# Patient Record
Sex: FEMALE | Race: OTHER | HISPANIC OR LATINO | URBAN - METROPOLITAN AREA
[De-identification: names, ages, dates, MRNs, and addresses within clinical notes are randomized per-mention and may not be internally consistent; named-entity substitution may affect disease eponyms.]

---

## 2017-09-28 ENCOUNTER — EMERGENCY (EMERGENCY)
Facility: HOSPITAL | Age: 47
LOS: 0 days | Discharge: HOME | End: 2017-09-29

## 2017-09-28 DIAGNOSIS — R35.0 FREQUENCY OF MICTURITION: ICD-10-CM

## 2017-09-28 DIAGNOSIS — N30.91 CYSTITIS, UNSPECIFIED WITH HEMATURIA: ICD-10-CM

## 2019-12-27 ENCOUNTER — EMERGENCY (EMERGENCY)
Facility: HOSPITAL | Age: 49
LOS: 0 days | Discharge: HOME | End: 2019-12-27
Admitting: EMERGENCY MEDICINE
Payer: MEDICAID

## 2019-12-27 VITALS
DIASTOLIC BLOOD PRESSURE: 69 MMHG | RESPIRATION RATE: 18 BRPM | SYSTOLIC BLOOD PRESSURE: 133 MMHG | HEART RATE: 62 BPM | OXYGEN SATURATION: 99 %

## 2019-12-27 DIAGNOSIS — R51 HEADACHE: ICD-10-CM

## 2019-12-27 DIAGNOSIS — Z79.899 OTHER LONG TERM (CURRENT) DRUG THERAPY: ICD-10-CM

## 2019-12-27 DIAGNOSIS — J34.89 OTHER SPECIFIED DISORDERS OF NOSE AND NASAL SINUSES: ICD-10-CM

## 2019-12-27 DIAGNOSIS — B00.1 HERPESVIRAL VESICULAR DERMATITIS: ICD-10-CM

## 2019-12-27 PROCEDURE — 99283 EMERGENCY DEPT VISIT LOW MDM: CPT

## 2019-12-27 RX ORDER — ACYCLOVIR 50 MG/G
1 OINTMENT TOPICAL
Qty: 30 | Refills: 0
Start: 2019-12-27 | End: 2019-12-31

## 2019-12-27 NOTE — ED PROVIDER NOTE - PATIENT PORTAL LINK FT
You can access the FollowMyHealth Patient Portal offered by [facility] by registering at the following website: [email or web address]. By joining [facility]’s FollowMyHealth portal, you will also be able to view your health information using other applications (apps) compatible with our system.

## 2019-12-27 NOTE — ED PROVIDER NOTE - PHYSICAL EXAMINATION
CONSTITUTIONAL: Well-appearing; well-nourished; in no apparent distress.   EYES: PERRL; EOM intact.   ENT: normal nose; no rhinorrhea; normal pharynx with no tonsillar hypertrophy.   NECK: Supple; non-tender; no cervical lymphadenopathy.   CARDIOVASCULAR: Normal S1, S2; no murmurs, rubs, or gallops.   RESPIRATORY: Normal chest excursion with respiration; breath sounds clear and equal bilaterally; no wheezes, rhonchi, or rales.  GI/: Normal bowel sounds; non-distended; non-tender; no palpable organomegaly.   MS: No evidence of trauma or deformity. Normal ROM in all four extremities; non-tender to palpation; distal pulses are normal.   SKIN: + vesicles surrounding lips with crusting noted. No discharge/ Mucous membrane involvement  NEURO/PSYCH: A & O x 4; grossly unremarkable. mood and manner are appropriate. Grooming and personal hygiene are appropriate. No apparent thoughts of harm to self or others.

## 2019-12-27 NOTE — ED ADULT NURSE NOTE - NSIMPLEMENTINTERV_GEN_ALL_ED
Implemented All Universal Safety Interventions:  [place] to call system. Call bell, personal items and telephone within reach. Instruct patient to call for assistance. Room bathroom lighting operational. Non-slip footwear when patient is off stretcher. Physically safe environment: no spills, clutter or unnecessary equipment. Stretcher in lowest position, wheels locked, appropriate side rails in place.

## 2019-12-27 NOTE — ED PROVIDER NOTE - OBJECTIVE STATEMENT
49 year old F no pmhx c/o vesicles surrounding lips x 1 week. Also notes uri symptoms, nasal congestion, sore throat, fatigue and mild headache. No fever, chills, body aches, ear pain, cough, chest pain, sob.     HX obtained via  #[id number]

## 2019-12-27 NOTE — ED PROVIDER NOTE - NS ED ROS FT
Constitutional: no fever, chills, no recent weight loss, change in appetite or malaise  Eyes: no redness/discharge/pain/vision changes  ENT: + rhinorrhea, sore throat  Cardiac: No chest pain, SOB or edema.  Respiratory: No cough or respiratory distress  GI: No nausea, vomiting, diarrhea or abdominal pain.  MS: no pain to back or extremities, no loss of ROM, no weakness  Neuro: No headache or weakness. No LOC.  Skin: + vesicles surrounding lips  Endocrine: No history of thyroid disease or diabetes.  Except as documented in the HPI, all other systems are negative.

## 2021-07-24 ENCOUNTER — EMERGENCY (EMERGENCY)
Facility: HOSPITAL | Age: 51
LOS: 0 days | Discharge: HOME | End: 2021-07-24
Attending: EMERGENCY MEDICINE | Admitting: EMERGENCY MEDICINE
Payer: MEDICAID

## 2021-07-24 VITALS
TEMPERATURE: 98 F | SYSTOLIC BLOOD PRESSURE: 128 MMHG | OXYGEN SATURATION: 97 % | DIASTOLIC BLOOD PRESSURE: 61 MMHG | HEART RATE: 61 BPM | RESPIRATION RATE: 18 BRPM

## 2021-07-24 VITALS
SYSTOLIC BLOOD PRESSURE: 115 MMHG | RESPIRATION RATE: 18 BRPM | TEMPERATURE: 97 F | OXYGEN SATURATION: 100 % | HEART RATE: 50 BPM | DIASTOLIC BLOOD PRESSURE: 67 MMHG

## 2021-07-24 DIAGNOSIS — K08.89 OTHER SPECIFIED DISORDERS OF TEETH AND SUPPORTING STRUCTURES: ICD-10-CM

## 2021-07-24 DIAGNOSIS — Z23 ENCOUNTER FOR IMMUNIZATION: ICD-10-CM

## 2021-07-24 PROCEDURE — 99283 EMERGENCY DEPT VISIT LOW MDM: CPT

## 2021-07-24 RX ORDER — BNT162B2 0.23 MG/2.25ML
0.3 INJECTION, SUSPENSION INTRAMUSCULAR ONCE
Refills: 0 | Status: COMPLETED | OUTPATIENT
Start: 2021-07-24 | End: 2021-07-24

## 2021-07-24 RX ORDER — IBUPROFEN 200 MG
600 TABLET ORAL ONCE
Refills: 0 | Status: COMPLETED | OUTPATIENT
Start: 2021-07-24 | End: 2021-07-24

## 2021-07-24 RX ORDER — AMOXICILLIN 250 MG/5ML
500 SUSPENSION, RECONSTITUTED, ORAL (ML) ORAL ONCE
Refills: 0 | Status: COMPLETED | OUTPATIENT
Start: 2021-07-24 | End: 2021-07-24

## 2021-07-24 RX ORDER — AMOXICILLIN 250 MG/5ML
1 SUSPENSION, RECONSTITUTED, ORAL (ML) ORAL
Qty: 21 | Refills: 0
Start: 2021-07-24 | End: 2021-07-30

## 2021-07-24 RX ADMIN — BNT162B2 0.3 MILLILITER(S): 0.23 INJECTION, SUSPENSION INTRAMUSCULAR at 16:29

## 2021-07-24 RX ADMIN — Medication 500 MILLIGRAM(S): at 15:46

## 2021-07-24 NOTE — ED ADULT NURSE NOTE - NSIMPLEMENTINTERV_GEN_ALL_ED
Implemented All Universal Safety Interventions:  [place] to call system. Call bell, personal items and telephone within reach. Instruct patient to call for assistance. Room bathroom lighting operational. Non-slip footwear when patient is off stretcher. Physically safe environment: no spills, clutter or unnecessary equipment. Stretcher in lowest position, wheels locked, appropriate side rails in place.

## 2021-07-24 NOTE — ED PROVIDER NOTE - PATIENT PORTAL LINK FT
You can access the FollowMyHealth Patient Portal offered by [facility] by registering at the following website: [email or web address]. By joining [facility]’s FollowMyHealth portal, you will also be able to view your health information using other applications (apps) compatible with our system.

## 2021-07-24 NOTE — ED PROVIDER NOTE - ATTENDING CONTRIBUTION TO CARE
50 yo f   presents with toothache.  cap fell off while brushing her teeth.  Tender to percussion tooth# 7   .  No abscess.  will start abx and nsaids.  requesting covid vaccine

## 2021-07-24 NOTE — ED PROVIDER NOTE - PHYSICAL EXAMINATION
CONST: Well appearing in NAD  EYES: PERRL, EOMI, Sclera and conjunctiva clear.   ENT: ttp to tooth 7, crown missing. No nasal discharge. TM's clear B/L without drainage. Oropharynx normal appearing, no erythema or exudates. No abscess or swelling. Uvula midline. No temporal artery or mastoid tenderness.  NECK: Non-tender, no meningeal signs. normal ROM. supple   CARD: Normal S1 S2; Normal rate and rhythm  RESP: Equal BS B/L, No wheezes, rhonchi or rales. No distress  SKIN: Warm, dry, no acute rashes. Good turgor

## 2021-07-24 NOTE — ED PROVIDER NOTE - OBJECTIVE STATEMENT
51 year old female with no pmhx presents with right upper dental pain. pt admits her crown fell out 1 day ago. no swelling or fever. pt also requesting covid vaccine.

## 2021-07-26 ENCOUNTER — OUTPATIENT (OUTPATIENT)
Dept: OUTPATIENT SERVICES | Facility: HOSPITAL | Age: 51
LOS: 1 days | Discharge: HOME | End: 2021-07-26

## 2021-07-26 PROBLEM — Z78.9 OTHER SPECIFIED HEALTH STATUS: Chronic | Status: ACTIVE | Noted: 2021-07-24

## 2021-08-06 ENCOUNTER — OUTPATIENT (OUTPATIENT)
Dept: OUTPATIENT SERVICES | Facility: HOSPITAL | Age: 51
LOS: 1 days | Discharge: HOME | End: 2021-08-06

## 2022-03-10 ENCOUNTER — EMERGENCY (EMERGENCY)
Facility: HOSPITAL | Age: 52
LOS: 0 days | Discharge: HOME | End: 2022-03-11
Attending: EMERGENCY MEDICINE | Admitting: EMERGENCY MEDICINE
Payer: MEDICAID

## 2022-03-10 VITALS
SYSTOLIC BLOOD PRESSURE: 139 MMHG | HEART RATE: 66 BPM | RESPIRATION RATE: 18 BRPM | OXYGEN SATURATION: 99 % | DIASTOLIC BLOOD PRESSURE: 78 MMHG | TEMPERATURE: 98 F

## 2022-03-10 DIAGNOSIS — R50.9 FEVER, UNSPECIFIED: ICD-10-CM

## 2022-03-10 DIAGNOSIS — R21 RASH AND OTHER NONSPECIFIC SKIN ERUPTION: ICD-10-CM

## 2022-03-10 PROCEDURE — 99283 EMERGENCY DEPT VISIT LOW MDM: CPT

## 2022-03-10 RX ORDER — HYDROCORTISONE 1 %
1 OINTMENT (GRAM) TOPICAL
Qty: 1 | Refills: 0
Start: 2022-03-10 | End: 2022-03-19

## 2022-03-10 NOTE — ED PROVIDER NOTE - PHYSICAL EXAMINATION
Vital Signs: I have reviewed the initial vital signs.  Constitutional: well-nourished, appears stated age, no acute distress.  HEENT: Airway patent, moist MM, no erythema/swelling/deformity of oral structures. EOMI, PERRLA.  CV: regular rate, regular rhythm, well-perfused extremities, 2+ bilateral DP and radial pulses equal.  Lungs: Clear to ascultation bilaterally, no crackles, no wheezes, no increased work of breathing.  ABD: Non-tender, Non-distended, no guarding or rebound, no pulsatile mass, no hernias, no flank pain.   MSK: Neck supple, nontender, normal range of motion, no stepoff. Chest nontender. Back nontender   INTEG: Skin warm, dry, scattered papular skin color rash to bilateral arms. Scaly rash to left hip and right volar surface of elbow.  NEURO: A&Ox3, moving all extremities, normal speech  PSYCH: Calm, cooperative, normal affect and interaction.

## 2022-03-10 NOTE — ED PROVIDER NOTE - NSFOLLOWUPINSTRUCTIONS_ED_ALL_ED_FT
Eczema    Eczema, also called atopic dermatitis, is a skin disorder that causes inflammation of the skin. It causes a red rash and dry, scaly skin. The skin becomes very itchy. Eczema is generally worse during the cooler winter months and often improves with the warmth of summer. Eczema usually starts showing signs in infancy. Some children outgrow eczema, but it may last through adulthood.     CAUSES  The exact cause of eczema is not known, but it appears to run in families. People with eczema often have a family history of eczema, allergies, asthma, or hay fever. Eczema is not contagious.    Flare-ups of the condition may be caused by:     Contact with something you are sensitive or allergic to.    Stress.     SIGNS AND SYMPTOMS  Dry, scaly skin.    Red, itchy rash.    Itchiness. This may occur before the skin rash and may be very intense.      DIAGNOSIS  The diagnosis of eczema is usually made based on symptoms and medical history.    TREATMENT  Eczema cannot be cured, but symptoms usually can be controlled with treatment and other strategies. A treatment plan might include:    Controlling the itching and scratching.    Use over-the-counter antihistamines as directed for itching. This is especially useful at night when the itching tends to be worse.    Use over-the-counter steroid creams as directed for itching.    Avoid scratching. Scratching makes the rash and itching worse. It may also result in a skin infection (impetigo) due to a break in the skin caused by scratching.    Keeping the skin well moisturized with creams every day. This will seal in moisture and help prevent dryness. Lotions that contain alcohol and water should be avoided because they can dry the skin.    Limiting exposure to things that you are sensitive or allergic to (allergens).    Recognizing situations that cause stress.    Developing a plan to manage stress.      HOME CARE INSTRUCTIONS  Only take over-the-counter or prescription medicines as directed by your health care provider.    Do not use anything on the skin without checking with your health care provider.    Keep baths or showers short (5 minutes) in warm (not hot) water. Use mild cleansers for bathing. These should be unscented. You may add nonperfumed bath oil to the bath water. It is best to avoid soap and bubble bath.    Immediately after a bath or shower, when the skin is still damp, apply a moisturizing ointment to the entire body. This ointment should be a petroleum ointment. This will seal in moisture and help prevent dryness. The thicker the ointment, the better. These should be unscented.    Keep fingernails cut short. Children with eczema may need to wear soft gloves or mittens at night after applying an ointment.    Dress in clothes made of cotton or cotton blends. Dress lightly, because heat increases itching.    A child with eczema should stay away from anyone with fever blisters or cold sores. The virus that causes fever blisters (herpes simplex) can cause a serious skin infection in children with eczema.     SEEK MEDICAL CARE IF:  Your itching interferes with sleep.    Your rash gets worse or is not better within 1 week after starting treatment.    You see pus or soft yellow scabs in the rash area.    You have a fever.    You have a rash flare-up after contact with someone who has fever blisters.      ADDITIONAL NOTES AND INSTRUCTIONS    Please follow up with your Primary MD in 24-48 hr.  Seek immediate medical care for any new/worsening signs or symptoms.

## 2022-03-10 NOTE — ED PROVIDER NOTE - OBJECTIVE STATEMENT
SAID ID # [id number]. 51-year-old female previously healthy, on no medications, no prior allergies, presents with rash that began on her legs and that had now spread to the rest of her body. Onset 2 weeks ago. Itchy, itchiness significantly improved with Benadryl but rash remains. This began after having a cold for 2 days and fever for 1 day, but all this has since resolved. Did not get tested for Covid at that time. Last COVID vaccine in August. Denies all other symptoms including fever, cough, vomiting, shortness of breath, chest pain, abdominal pain.

## 2022-03-10 NOTE — ED PROVIDER NOTE - NS ED ROS FT
Review of Systems    Constitutional: (-) fever  Cardiovascular: (-) chest pain, (-) syncope  Respiratory: (-) cough, (-) shortness of breath  Gastrointestinal: (-) vomiting, (-) diarrhea, (-) abdominal pain  Musculoskeletal: (-) neck pain, (-) back pain, (-) joint pain  Integumentary: (+) diffuse rash, (-) edema  Neurological: (-) headache, (-) altered mental status    Except as documented in the HPI, all other systems are negative.

## 2022-03-10 NOTE — ED PROVIDER NOTE - PATIENT PORTAL LINK FT
You can access the FollowMyHealth Patient Portal offered by [facility] by registering at the following website: [email or web address]. By joining [facility]’s FollowMyHealth portal, you will also be able to view your health information using other applications (apps) compatible with our system.

## 2022-03-10 NOTE — ED PROVIDER NOTE - ATTENDING CONTRIBUTION TO CARE
[id number]. 51-year-old female previously healthy, on no medications, no prior allergies, presents with rash that began on her legs and that had now spread to the rest of her body. Onset 2 weeks ago. Itchy, itchiness significantly improved with Benadryl but rash remains. This began after having a cold for 2 days and fever for 1 day, but all this has since resolved. Did not get tested for Covid at that time. Last COVID vaccine in August. Denies all other symptoms including fever, cough, vomiting, shortness of breath, chest pain, abdominal pain. On exam, afebrile, hemodynamically stable, saturating well, NAD, well appearing, sitting comfortably in chair, no WOB, speaking full sentences, head NCAT, EOMI grossly, breathing comfortably on room air, AAO, CN's 3-12 grossly intact, SALMERON spontaneously, no leg cyanosis or edema, skin warm, dry, scattered papular skin color rash to bilateral arms. Scaly rash to left hip and right volar surface of elbow. No surrounding erythema. No fever or systemic symptoms. No petechiae or evidence of thrombocytopenia.  does not have similar rash. Appearance of a dermatitis. Will prescribe hydrocortisone cream and oral prednisone. Patient is well appearing, NAD, afebrile, hemodynamically stable. Discharged with instructions in further symptomatic care, return precautions, and need for derm f/u.

## 2022-03-10 NOTE — ED PROVIDER NOTE - CARE PROVIDER_API CALL
Ming Muller (MD)  Dermatology; Internal Medicine  [address] [place] [address]  Phone: [phone]  Fax: [phone]  Follow Up Time: 1-3 Days

## 2022-03-10 NOTE — ED PROVIDER NOTE - CLINICAL SUMMARY MEDICAL DECISION MAKING FREE TEXT BOX
[id number]. 51-year-old female previously healthy, on no medications, no prior allergies, presents with rash that began on her legs and that had now spread to the rest of her body. Onset 2 weeks ago. Itchy, itchiness significantly improved with Benadryl but rash remains. This began after having a cold for 2 days and fever for 1 day, but all this has since resolved. Did not get tested for Covid at that time. Last COVID vaccine in August. Denies all other symptoms including fever, cough, vomiting, shortness of breath, chest pain, abdominal pain. On exam, afebrile, hemodynamically stable, saturating well, NAD, well appearing, sitting comfortably in chair, no WOB, speaking full sentences, head NCAT, EOMI grossly, breathing comfortably on room air, AAO, CN's 3-12 grossly intact, SALMERON spontaneously, no leg cyanosis or edema, skin warm, dry, scattered papular skin color rash to bilateral arms. Scaly rash to left hip and right volar surface of elbow. No surrounding erythema. No fever or systemic symptoms. No petechiae or evidence of thrombocytopenia.  does not have similar rash. Appearance of a dermatitis. Will prescribe hydrocortisone cream and oral prednisone. Patient is well appearing, NAD, afebrile, hemodynamically stable. Discharged with instructions in further symptomatic care, return precautions, and need for derm f/u.

## 2022-08-31 ENCOUNTER — OUTPATIENT (OUTPATIENT)
Dept: OUTPATIENT SERVICES | Facility: HOSPITAL | Age: 52
LOS: 1 days | Discharge: HOME | End: 2022-08-31

## 2023-11-08 ENCOUNTER — OUTPATIENT (OUTPATIENT)
Dept: OUTPATIENT SERVICES | Facility: HOSPITAL | Age: 53
LOS: 1 days | End: 2023-11-08
Payer: SELF-PAY

## 2023-11-08 DIAGNOSIS — K02.9 DENTAL CARIES, UNSPECIFIED: ICD-10-CM

## 2023-11-08 PROCEDURE — D0140: CPT

## 2023-11-08 PROCEDURE — D0220: CPT
